# Patient Record
Sex: MALE | Race: OTHER | NOT HISPANIC OR LATINO | URBAN - METROPOLITAN AREA
[De-identification: names, ages, dates, MRNs, and addresses within clinical notes are randomized per-mention and may not be internally consistent; named-entity substitution may affect disease eponyms.]

---
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
## 2021-02-16 NOTE — ED PROVIDER NOTE - PROGRESS NOTE DETAILS
ROCKY SMALL  I have personally evaluated and examined the patient. Dr. collins was available to me as a supervising provider in needed. Shantal Ulloa MS, RN, CPNP-PC

## 2021-05-05 ENCOUNTER — APPOINTMENT (OUTPATIENT)
Age: 21
End: 2021-05-05

## 2022-10-07 NOTE — ED PEDIATRIC TRIAGE NOTE - ESI TRIAGE ACUITY LEVEL, MLM
4
Quality 137: Melanoma: Continuity Of Care - Recall System: Patient information entered into a recall system that includes: target date for the next exam specified AND a process to follow up with patients regarding missed or unscheduled appointments
Quality 226: Preventive Care And Screening: Tobacco Use: Screening And Cessation Intervention: Patient screened for tobacco use and is an ex/non-smoker
Quality 110: Preventive Care And Screening: Influenza Immunization: Influenza Immunization previously received during influenza season
Detail Level: Detailed
Quality 431: Preventive Care And Screening: Unhealthy Alcohol Use - Screening: Patient not identified as an unhealthy alcohol user when screened for unhealthy alcohol use using a systematic screening method

## 2023-06-05 NOTE — ED PEDIATRIC TRIAGE NOTE - AS TEMP SITE
I have seen this patient with the fellow and agree with their assessment and plan. I was physically present for significant portions of the evaluation and management (E/M) service provided.  I agree with the above history, physical, and plan which I have reviewed and edited where appropriate.    Mary Ureña MD  Pager   Office     Contact me directly via [employer] Teams     (After 5 pm or on weekends please page the on-call fellow/attending, can check [email or web address] for schedule. Login is keo newman, schedule under [facility] medicine, psych, derm) I have seen this patient with the fellow and agree with their assessment and plan. I was physically present for significant portions of the evaluation and management (E/M) service provided.  I agree with the above history, physical, and plan which I have reviewed and edited where appropriate.      Pt has known CKD and stable  No renal contraindication for vascular angiogram to be done as needed for ongoing dx  Please continue fluids pre and post with LR or bicarb or NS of choice  dw with arin Ureña MD  Pager   Office     Contact me directly via Microsoft Teams     (After 5 pm or on weekends please page the on-call fellow/attending, can check [email or web address] for schedule. Login is keo newman, schedule under [facility] medicine, psych, derm) oral

## 2024-10-28 NOTE — ED BEHAVIORAL HEALTH ASSESSMENT NOTE - ORIENTED TO PLACE
Congregate Living Authorization    The Critical access hospitalte Living Review Committee (CLRC) has reviewed this case and the committee DOES NOT RECOMMEND discharge to a skilled nursing facility under skilled care.    The CLRC recommends:  Home with home health and any other appropriate caregiver assistance or medical equipment as needed vs respite in SNF.     Does not appear to have skilled services for VARGAS, but additional home support appears to be needed.    For questions regarding CLRC approval process, please contact the CM assigned to the case.  For questions regarding RN discharge workflow, please contact the unit Clinical Leader.   Yes

## 2024-12-17 NOTE — ED PROVIDER NOTE - CPE EDP RESP NORM
Detail Level: Zone Initiate Treatment: Zyrtec (Take one PO QD) Plan: Decreased shower temperatures Render In Strict Bullet Format?: No - - -